# Patient Record
Sex: FEMALE | NOT HISPANIC OR LATINO | URBAN - METROPOLITAN AREA
[De-identification: names, ages, dates, MRNs, and addresses within clinical notes are randomized per-mention and may not be internally consistent; named-entity substitution may affect disease eponyms.]

---

## 2019-09-26 ENCOUNTER — IMPORTED ENCOUNTER (OUTPATIENT)
Dept: URBAN - METROPOLITAN AREA CLINIC 1 | Facility: CLINIC | Age: 59
End: 2019-09-26

## 2019-09-26 PROBLEM — B00.52: Noted: 2019-09-26

## 2019-09-26 PROBLEM — H16.143: Noted: 2019-09-26

## 2019-09-26 PROBLEM — H04.123: Noted: 2019-09-26

## 2019-09-26 PROCEDURE — 92002 INTRM OPH EXAM NEW PATIENT: CPT

## 2019-09-26 NOTE — PATIENT DISCUSSION
1.  HSV keratitis OU -  Discussed [facility] patient. Begin Zirgan QID OS and Valtrex 500mg po BID. (ERX)2. URIEL w/ PEK OU- Begin AT's Q2 hours OS. Return for an appointment in Monday 30 (MAGALY liang) and DFE with Dr. Dian Anderson.

## 2019-09-30 ENCOUNTER — IMPORTED ENCOUNTER (OUTPATIENT)
Dept: URBAN - METROPOLITAN AREA CLINIC 1 | Facility: CLINIC | Age: 59
End: 2019-09-30

## 2019-09-30 PROBLEM — B00.52: Noted: 2019-09-30

## 2019-09-30 PROCEDURE — 99213 OFFICE O/P EST LOW 20 MIN: CPT

## 2019-09-30 NOTE — PATIENT DISCUSSION
1.  HSV keratitis OS - Mostly resolved Cont Zirgan QID OS until gone and Valtrex 500mg po BID. 2.  URIEL w/ PEK OU- Recommend AT's QID-Q2hr OU. 3.  Cataract OU- ObserveReturn for an appointment in 1 week 27 with Dr. Joni Schaumann.

## 2019-10-08 ENCOUNTER — IMPORTED ENCOUNTER (OUTPATIENT)
Dept: URBAN - METROPOLITAN AREA CLINIC 1 | Facility: CLINIC | Age: 59
End: 2019-10-08

## 2019-10-08 PROBLEM — H25.813: Noted: 2019-10-08

## 2019-10-08 PROBLEM — H16.143: Noted: 2019-10-08

## 2019-10-08 PROBLEM — B00.52: Noted: 2019-10-08

## 2019-10-08 PROBLEM — H04.123: Noted: 2019-10-08

## 2019-10-08 PROCEDURE — 92014 COMPRE OPH EXAM EST PT 1/>: CPT

## 2019-10-08 NOTE — PATIENT DISCUSSION
1.  Cataract OU -- Observe for now without intervention. The patient was advised to contact us if any change or worsening of vision2. HSV Keratitis OS -- Resolved. D/c Sherrie. 3.  URIEL w/ PEK OU -- Recommend the routine use of ATs QID OU. Patient defers Mrx today. Return for an appointment in 1 year for a 30 with Dr. Kia Culver.

## 2019-10-23 ENCOUNTER — IMPORTED ENCOUNTER (OUTPATIENT)
Dept: URBAN - METROPOLITAN AREA CLINIC 1 | Facility: CLINIC | Age: 59
End: 2019-10-23

## 2019-10-23 PROBLEM — H16.143: Noted: 2019-10-23

## 2019-10-23 PROBLEM — H04.123: Noted: 2019-10-23

## 2019-10-23 PROCEDURE — 92012 INTRM OPH EXAM EST PATIENT: CPT

## 2019-10-23 NOTE — PATIENT DISCUSSION
1.  URIEL w/ PEK OU- Recommend AT's TID-QID O0U routinely and gel drops use an otc ointment QHS OU. (sample of refresh gel given)2. HSV keratitis OS - Resolved OS no signs of HSV OD d/c the use of Zirgan. 3. Cataract OU- ObserveReturn for an appointment for Return as scheduled with Dr. Trisha Alston.

## 2019-11-14 ENCOUNTER — IMPORTED ENCOUNTER (OUTPATIENT)
Dept: URBAN - METROPOLITAN AREA CLINIC 1 | Facility: CLINIC | Age: 59
End: 2019-11-14

## 2019-11-14 PROBLEM — H18.832: Noted: 2019-11-14

## 2019-11-14 PROBLEM — H04.123: Noted: 2019-11-14

## 2019-11-14 PROBLEM — H16.143: Noted: 2019-11-14

## 2019-11-14 PROCEDURE — 92071 CONTACT LENS FITTING FOR TX: CPT

## 2019-11-14 PROCEDURE — 92012 INTRM OPH EXAM EST PATIENT: CPT

## 2019-11-14 NOTE — PATIENT DISCUSSION
1. RES OS -- Symptoms are likely related to a previous injury to OS (20-30 years ago). Patient states she was poked in the eye and saw an eye doctor at the time who was concerned with her losing vision in that eye. A bandage CL was placed today OS. Begin Ofloxacin BID OS x until seen Monday (erx'd). In addition recommend PF ATs Q1h OS increasing more PRN. 2.  URIEL w/ PEK OU -- Recommend AT's TID-QID OD routinely and Q1h OS until symptoms resolve OS. 3. Cataract OU -- Observe4. H/o HSV Keratitis OSReturn for an appointment in Monday for a 10 with Dr. Kristy Castillo.

## 2019-11-18 ENCOUNTER — IMPORTED ENCOUNTER (OUTPATIENT)
Dept: URBAN - METROPOLITAN AREA CLINIC 1 | Facility: CLINIC | Age: 59
End: 2019-11-18

## 2019-11-18 PROBLEM — H18.832: Noted: 2019-11-18

## 2019-11-18 PROCEDURE — 99213 OFFICE O/P EST LOW 20 MIN: CPT

## 2019-11-18 NOTE — PATIENT DISCUSSION
1.  RES OS: Since not 100% re-epithelialized will leave BCL in place. Cont Ofloxacin BID OS. H/o injury OS (20-30 years ago) 2. URIEL w/ PEK OU - May use ATs QID OU routinely 3. Cataract OU - Observe4. H/o HSV Keratitis OSReturn for an appointment in Thursday/Friday 10 with Dr. Leyla Robertson.

## 2019-11-18 NOTE — PATIENT DISCUSSION
URIEL w/ PEK OU -- Recommend AT's TID-QID OD routinely and Q1h OS until symptoms resolve OS. 3. Cataract OU -- Observe4.   H/o HSV Keratitis OS

## 2019-11-22 ENCOUNTER — IMPORTED ENCOUNTER (OUTPATIENT)
Dept: URBAN - METROPOLITAN AREA CLINIC 1 | Facility: CLINIC | Age: 59
End: 2019-11-22

## 2019-11-22 PROBLEM — H18.832: Noted: 2019-11-22

## 2019-11-22 PROCEDURE — 99213 OFFICE O/P EST LOW 20 MIN: CPT

## 2019-11-22 NOTE — PATIENT DISCUSSION
1.  RES OS- H/o injury OS (20-30 years ago). 100% re-epithelialized BCL removed today. D/c the of Ofloxacin OS. 2.  URIEL w/ PEK OU - Cont ATs QID OU routinely and otc ointment at bedtime OU. 3.  Cataract OU - Observe4. H/o HSV Keratitis OSReturn for an appointment for Return as scheduled with Dr. Tasha Win.

## 2020-07-13 NOTE — PATIENT DISCUSSION
Refer to Dr. Keith Downs for consult OU. Patient wants to wait on consult and will call when she is ready.

## 2020-10-08 ENCOUNTER — IMPORTED ENCOUNTER (OUTPATIENT)
Dept: URBAN - METROPOLITAN AREA CLINIC 1 | Facility: CLINIC | Age: 60
End: 2020-10-08

## 2020-10-08 PROBLEM — H16.143: Noted: 2020-10-08

## 2020-10-08 PROBLEM — H04.123: Noted: 2020-10-08

## 2020-10-08 PROBLEM — H25.813: Noted: 2020-10-08

## 2020-10-08 PROCEDURE — 92015 DETERMINE REFRACTIVE STATE: CPT

## 2020-10-08 PROCEDURE — 92014 COMPRE OPH EXAM EST PT 1/>: CPT

## 2020-10-08 NOTE — PATIENT DISCUSSION
1.  Cataract OU: Observe for now without intervention. The patient was advised to contact us if any change or worsening of vision2. URIEL w/ PEK OU -- Cont the recommended use of ATs BID OU routinely (Sample of Refresh Relieva given). Begin the use of Gel ATs QHS OU (Sample of Blink gel given). 3.  RES OS- H/o injury OS (20-30 years ago). 100% re-epithelialized4. H/o HSV Keratitis OSPatient deferred Manifest Rx today. Return for an appointment in 1 year 27 with Dr. Josue.

## 2022-04-02 ASSESSMENT — VISUAL ACUITY
OS_CC: 20/25+2
OS_CC: 20/20
OS_CC: 20/250
OD_CC: 20/20
OD_CC: 20/30-1
OD_CC: 20/25-1
OS_PH: SC 20/70
OS_CC: 20/100
OS_CC: 20/20
OS_CC: 20/100
OS_CC: 20/30-1
OD_CC: 20/30
OS_PH: SC 20/80
OD_CC: 20/25-2
OD_CC: 20/25-1
OD_CC: 20/25-1
OS_PH: SC 20/100
OS_CC: 20/30-1
OD_CC: 20/25

## 2022-04-02 ASSESSMENT — TONOMETRY
OS_IOP_MMHG: 15
OD_IOP_MMHG: 14
OD_IOP_MMHG: 17
OD_IOP_MMHG: 15
OS_IOP_MMHG: 16
OD_IOP_MMHG: 16